# Patient Record
Sex: FEMALE | Race: BLACK OR AFRICAN AMERICAN | NOT HISPANIC OR LATINO | URBAN - METROPOLITAN AREA
[De-identification: names, ages, dates, MRNs, and addresses within clinical notes are randomized per-mention and may not be internally consistent; named-entity substitution may affect disease eponyms.]

---

## 2019-05-28 ENCOUNTER — EMERGENCY (EMERGENCY)
Facility: HOSPITAL | Age: 74
LOS: 1 days | Discharge: HOME | End: 2019-05-28
Admitting: EMERGENCY MEDICINE
Payer: MEDICARE

## 2019-05-28 VITALS
OXYGEN SATURATION: 99 % | DIASTOLIC BLOOD PRESSURE: 84 MMHG | SYSTOLIC BLOOD PRESSURE: 157 MMHG | TEMPERATURE: 98 F | RESPIRATION RATE: 18 BRPM | HEART RATE: 83 BPM

## 2019-05-28 DIAGNOSIS — Z98.89 OTHER SPECIFIED POSTPROCEDURAL STATES: Chronic | ICD-10-CM

## 2019-05-28 PROCEDURE — 99284 EMERGENCY DEPT VISIT MOD MDM: CPT | Mod: GC

## 2019-05-28 RX ORDER — AZITHROMYCIN 500 MG/1
1 TABLET, FILM COATED ORAL
Qty: 1 | Refills: 0
Start: 2019-05-28

## 2019-05-28 RX ORDER — IPRATROPIUM/ALBUTEROL SULFATE 18-103MCG
3 AEROSOL WITH ADAPTER (GRAM) INHALATION ONCE
Refills: 0 | Status: COMPLETED | OUTPATIENT
Start: 2019-05-28 | End: 2019-05-28

## 2019-05-28 RX ADMIN — Medication 100 MILLIGRAM(S): at 12:32

## 2019-05-28 RX ADMIN — Medication 3 MILLILITER(S): at 12:31

## 2019-05-28 NOTE — ED PROVIDER NOTE - OBJECTIVE STATEMENT
74 yo F with PMHX of HTN and HLD, and seasonal allergies to pollen presents to the ED c/o cough, intermittently productive with white mucus x 5 day. Pt reports she sometimes heres a wheezing when she cough. + runny nose. Takes Allegra for allergies, was taking coricidin for cought with no relief. Endorsing she sometimes felt her heart rate was fast. Denies f, c. Denies cp ,sob, leg swelling, nausea, vomiting, abdominal pain, dizziness, numbness tingling ,weakness. 72 yo F with PMHX of HTN and HLD, and seasonal allergies to pollen presents to the ED c/o cough, intermittently productive with white mucus x 5 day. Pt reports she sometimes heres a wheezing when she cough. + runny nose. Takes Allegra for allergies, was taking coricidin for cough with no relief. Endorsing she sometimes felt her heart rate was fast. Denies f, c. Denies cp ,sob, leg swelling, nausea, vomiting, abdominal pain, dizziness, numbness tingling ,weakness.

## 2019-05-28 NOTE — ED ADULT NURSE REASSESSMENT NOTE - ANCILLARY STATUS
awaiting radiology/pt no longer wants to wait for xray, states she has an appointment to follow up with her PMD Friday and doesn't understand why it is taking so long.

## 2019-05-28 NOTE — ED PROVIDER NOTE - CLINICAL SUMMARY MEDICAL DECISION MAKING FREE TEXT BOX
74 yo F c/o cough and intermittent fast HR.   no cp, no sob.   will r/o pna, likely bronchitis vs allergic.   Plan neb, tessalon perrles, cxr.   pt refusing EKG at this time.

## 2019-05-28 NOTE — ED ADULT TRIAGE NOTE - CHIEF COMPLAINT QUOTE
Pt c/o cough x 4 days with yellow and white mucus accompanied by intermittent palpitations. Denies palpitations presently, denies fever or chest pain. PMH HTN HLD

## 2019-05-28 NOTE — ED PROVIDER NOTE - NSFOLLOWUPINSTRUCTIONS_ED_ALL_ED_FT
Seen in ED for cough, likely viral upper respiratory tract infection.  Please drink plenty of fluids and follow the instructions below:   1)	If you have fever greater than 100F or generalized bodyaches then please take Tylenol 650 mg every 4 hours and Motrin 600 mg every 6 hours.   2)	If you have head congestion, sinus pressure, or nasal congestion then please take Allegra 120-180 mg every 24 hours or Zyrtec 10 mg every 24 hours.    3)	If you have throat discomfort please take Cepacol or Chloraseptic spray. Check bottle to make sure alcohol free.   4)	If you have persistent dry cough please take Delsym or Robitussin.   5)	If you have difficulty bringing up mucus please take Mucinex use as directed.   6)	Make a follow-up appointment with your primary doctor.   7)	Return to ED for any new or worsening symptoms.     Take Azithromycin as prescribed.

## 2019-05-28 NOTE — ED PROVIDER NOTE - PROGRESS NOTE DETAILS
pt refusing to wait for cxr, she reports that she had follow up with her doctor, thursday, Pt understands with cannot r/o pna or any other pulm process without cxr. Pt understands and would still like to leave.   Will send rx for azithro. Pt A&Ox 4, able to make medical decisions and is deciding to leave the hospital now without cxr. pt left without papers or cxr, called and had conversation with patient via phone, pt upset about wait and did not want to stay any longer, she reports that she had follow up with her doctor, thursday/ friday. Pt understands with cannot r/o pna or any other pulm process without cxr. Pt understands  Will send rx for azithro. Pt A&Ox 4 throughout ED stay, able to make medical decisions

## 2021-10-03 ENCOUNTER — EMERGENCY (EMERGENCY)
Facility: HOSPITAL | Age: 76
LOS: 1 days | Discharge: HOME | End: 2021-10-03
Admitting: EMERGENCY MEDICINE
Payer: MEDICARE

## 2021-10-03 VITALS
RESPIRATION RATE: 20 BRPM | HEIGHT: 65 IN | SYSTOLIC BLOOD PRESSURE: 146 MMHG | TEMPERATURE: 98 F | OXYGEN SATURATION: 100 % | DIASTOLIC BLOOD PRESSURE: 71 MMHG | HEART RATE: 78 BPM

## 2021-10-03 DIAGNOSIS — Z98.89 OTHER SPECIFIED POSTPROCEDURAL STATES: Chronic | ICD-10-CM

## 2021-10-03 PROCEDURE — 73130 X-RAY EXAM OF HAND: CPT | Mod: 26,RT

## 2021-10-03 PROCEDURE — 70450 CT HEAD/BRAIN W/O DYE: CPT | Mod: 26

## 2021-10-03 PROCEDURE — 99284 EMERGENCY DEPT VISIT MOD MDM: CPT

## 2021-10-03 RX ORDER — TETANUS TOXOID, REDUCED DIPHTHERIA TOXOID AND ACELLULAR PERTUSSIS VACCINE, ADSORBED 5; 2.5; 8; 8; 2.5 [IU]/.5ML; [IU]/.5ML; UG/.5ML; UG/.5ML; UG/.5ML
0.5 SUSPENSION INTRAMUSCULAR ONCE
Refills: 0 | Status: COMPLETED | OUTPATIENT
Start: 2021-10-03 | End: 2021-10-03

## 2021-10-03 RX ADMIN — TETANUS TOXOID, REDUCED DIPHTHERIA TOXOID AND ACELLULAR PERTUSSIS VACCINE, ADSORBED 0.5 MILLILITER(S): 5; 2.5; 8; 8; 2.5 SUSPENSION INTRAMUSCULAR at 16:27

## 2021-10-03 NOTE — ED PROVIDER NOTE - OBJECTIVE STATEMENT
Pt is a 77 y/o F PMHx HTN, HLD p/w fall today.  Pt reports 3.5 hours ago, pt tripped over a hose, causing pt to land onto bilateral outstretched hands and strike right frontal aspect of head w/o associated LOC.  Pt reports sustaining an abrasion at frontal aspect of head.  Pt reports mild pain at right palm.  Pt denies any fevers, chills, nausea, vomiting, neck pain/stiffness, chest pain, back pain, numbness, weakness, changes in vision/hearing, lightheadedness, dizziness, use of blood thinners, ETOH abuse or any other specific complaints.

## 2021-10-03 NOTE — ED PROVIDER NOTE - CARE PLAN
Principal Discharge DX:	Abrasion  Secondary Diagnosis:	Contusion  Secondary Diagnosis:	Head injury   1

## 2021-10-03 NOTE — ED PROVIDER NOTE - CLINICAL SUMMARY MEDICAL DECISION MAKING FREE TEXT BOX
Pt is a 77 y/o F PMHx HTN, HLD p/w fall today. -- abrasion; r/o ICH; r/o fracture of hand -- adacel, ct head, xray hand

## 2021-10-03 NOTE — ED PROVIDER NOTE - PROGRESS NOTE DETAILS
CINTIA WILLARD:  Xray of hand negative for fractures.  CT head with following impression: "No evidence for calvarial fracture or acute intracranial hemorrhage.    A subcentimeter area of increased density involves the left frontal parietal centrum semiovale region, unchanged compared to the 2013 head CT. This could reflect a developmental venous anomaly, cavernous malformation, or a calcification. Consider eventual follow-up brain MRI with and without contrast and MRA for clarification if clinically warranted, if there are no MRI or contrast contraindications."  Pt medically stable for discharge.  Strict return precautions given. Pt directed to have outpatient MRI with PMD.

## 2021-10-03 NOTE — ED PROVIDER NOTE - UPPER EXTREMITY EXAM, RIGHT
+tenderness and bruising at palmar aspect of right hand; 5/5 strength; sensation intact to light touch; < 2 sec capillary refill ;2+ pulses

## 2021-10-03 NOTE — ED ADULT TRIAGE NOTE - CHIEF COMPLAINT QUOTE
Patient tripped and fell over a lawn hose and hit her right forehead on the concrete. No LOC. Pt has swelling to the area as well as an open abrasion. Pt denies blood thinners.

## 2021-10-03 NOTE — ED PROVIDER NOTE - PATIENT PORTAL LINK FT
You can access the FollowMyHealth Patient Portal offered by [facility] by registering at the following website: [email or web address]. By joining [employer]’s FollowMyHealth portal, you will also be able to view your health information using other applications (apps) compatible with our system.

## 2021-10-03 NOTE — ED ADULT NURSE REASSESSMENT NOTE - NS ED NURSE REASSESS COMMENT FT1
Patient sitting up awake and alert appearing on no distress.  Tetanus given as ordered, awaiting head ct.

## 2021-10-03 NOTE — ED PROVIDER NOTE - NSFOLLOWUPINSTRUCTIONS_ED_ALL_ED_FT
Advance activity as tolerated.  Continue all previously prescribed medications as directed unless otherwise instructed.  Take Tylenol 650mg (Two 325 mg pills) every 4-6 hours as needed for pain or fevers. Keep extremity elevated and wrapped.  Apply cool compresses to affected area for 15 minutes, 3-4 times per day.  Apply bacitracin (available over the counter) twice daily to affected area until healed.  Follow up with your primary care physician in 48-72 hours- bring copies of your results.  Return to the ER for worsening or persistent symptoms, including but not limited to worsening/persistent pain, headaches, dizziness, numbness, weakness, changes in vision or hearing, difficulty walking, falls, and/or ANY NEW OR CONCERNING SYMPTOMS. If you have issues obtaining follow up, please call: [phone] to obtain a doctor or specialist who takes your insurance in your area.  You may call [phone] to make an appointment with the internal medicine clinic. Advance activity as tolerated.  Continue all previously prescribed medications as directed unless otherwise instructed.  Take Tylenol 650mg (Two 325 mg pills) every 4-6 hours as needed for pain or fevers. Keep extremity elevated and wrapped.  Apply cool compresses to affected area for 15 minutes, 3-4 times per day.  Apply bacitracin (available over the counter) twice daily to affected area until healed.  Follow up with your primary care physician (have him/her order you an MRI brain w/ and w/o iv contrast and MR Angio of the brain) in 48-72 hours- bring copies of your results.  Return to the ER for worsening or persistent symptoms, including but not limited to worsening/persistent pain, headaches, dizziness, numbness, weakness, changes in vision or hearing, difficulty walking, falls, and/or ANY NEW OR CONCERNING SYMPTOMS. If you have issues obtaining follow up, please call: [phone] to obtain a doctor or specialist who takes your insurance in your area.  You may call [phone] to make an appointment with the internal medicine clinic.

## 2024-11-05 ENCOUNTER — EMERGENCY (EMERGENCY)
Facility: HOSPITAL | Age: 79
LOS: 0 days | Discharge: HOME | End: 2024-11-05
Attending: STUDENT IN AN ORGANIZED HEALTH CARE EDUCATION/TRAINING PROGRAM
Payer: MEDICARE

## 2024-11-05 VITALS
HEART RATE: 90 BPM | RESPIRATION RATE: 17 BRPM | TEMPERATURE: 98 F | OXYGEN SATURATION: 99 % | DIASTOLIC BLOOD PRESSURE: 79 MMHG | HEIGHT: 64 IN | SYSTOLIC BLOOD PRESSURE: 190 MMHG | WEIGHT: 175.05 LBS

## 2024-11-05 VITALS
HEART RATE: 61 BPM | TEMPERATURE: 98 F | RESPIRATION RATE: 18 BRPM | OXYGEN SATURATION: 99 % | SYSTOLIC BLOOD PRESSURE: 134 MMHG | DIASTOLIC BLOOD PRESSURE: 72 MMHG

## 2024-11-05 DIAGNOSIS — K59.00 CONSTIPATION, UNSPECIFIED: ICD-10-CM

## 2024-11-05 DIAGNOSIS — Z98.89 OTHER SPECIFIED POSTPROCEDURAL STATES: Chronic | ICD-10-CM

## 2024-11-05 DIAGNOSIS — E78.5 HYPERLIPIDEMIA, UNSPECIFIED: ICD-10-CM

## 2024-11-05 DIAGNOSIS — I10 ESSENTIAL (PRIMARY) HYPERTENSION: ICD-10-CM

## 2024-11-05 PROCEDURE — 99283 EMERGENCY DEPT VISIT LOW MDM: CPT

## 2024-11-05 RX ORDER — POLYETHYLENE GLYCOL 3350 17 G/17G
17 POWDER, FOR SOLUTION ORAL
Qty: 1 | Refills: 0
Start: 2024-11-05 | End: 2024-11-11

## 2024-11-05 NOTE — ED PROVIDER NOTE - PHYSICAL EXAMINATION
General appearance: Nontoxic appearing, conversant, afebrile    Eyes: anicteric sclerae, KUSUM, EOMI   HENT: Atraumatic; oropharynx clear, MMM and no ulcerations, no pharyngeal erythema or exudate   Neck: Trachea midline; Full range of motion, supple   Pulm: normal respiratory effort and no intercostal retractions, normal work of breathing   Abdomen: Soft, non-tender, non-distended; no guarding or rebound   Extremities: No peripheral edema, no gross deformities, FROM x4   Rectal: No hemorrhoids or external lesions, + semi firm stool in vault   Skin: Dry, normal temperature, turgor and texture; no rash   Psych: Appropriate affect, cooperative

## 2024-11-05 NOTE — ED ADULT NURSE NOTE - CAS TRG GENERAL AIRWAY, MLM
Consent: The patient's consent was obtained including but not limited to risks of crusting, scabbing, blistering, scarring, darker or lighter pigmentary change, recurrence, incomplete removal and infection. Render Post-Care Instructions In Note?: no Duration Of Freeze Thaw-Cycle (Seconds): 0 Post-Care Instructions: I reviewed with the patient in detail post-care instructions. Patient is to wear sunprotection, and avoid picking at any of the treated lesions. Pt may apply Vaseline  or Aquaphor to crusted or scabbing areas. Detail Level: Detailed Patent

## 2024-11-05 NOTE — ED PROVIDER NOTE - PROGRESS NOTE DETAILS
Right after enema administration, patient had very large bowel movement.  Reassessed shortly after and patient with complete resolution of symptoms.  No longer has any discomfort, no abdominal tenderness and denies complaints.  Is tolerating po.  answered questions. Will dc

## 2024-11-05 NOTE — ED ADULT NURSE NOTE - OBJECTIVE STATEMENT
Patient is a 79 year old female complaining of rectal pain that started this morning. Patient arrived in the ED agitated and screaming for pain. Patient states that she is constipated. PMHX: HTN, HLD, Hemorroids.  Enema given as ordered. Large BM with formed stool x 2. No bleeding noted.

## 2024-11-05 NOTE — ED PROVIDER NOTE - NS_EDPROVIDERDISPOUSERTYPE_ED_A_ED
Reviewed anticoagulation note. I agree    Dr. Ramiro Villalobos   Attending Attestation (For Attendings USE Only)...

## 2024-11-05 NOTE — ED ADULT NURSE NOTE - NSFALLUNIVINTERV_ED_ALL_ED
Bed/Stretcher in lowest position, wheels locked, appropriate side rails in place/Call bell, personal items and telephone in reach/Instruct patient to call for assistance before getting out of bed/chair/stretcher/Non-slip footwear applied when patient is off stretcher/[place] to call system/Physically safe environment - no spills, clutter or unnecessary equipment/Purposeful proactive rounding/Room/bathroom lighting operational, light cord in reach

## 2024-11-05 NOTE — ED PROVIDER NOTE - NSFOLLOWUPINSTRUCTIONS_ED_ALL_ED_FT
Rest, drink plenty of fluids  Advance activity as tolerated  Continue all previously prescribed medications as directed  Follow up with your PMD - bring copies of your results  Return to the ER for severe pain, worsening symptoms, fevers, or other new or concerning symptoms   For constipation you may take miralax daily as needed

## 2024-11-05 NOTE — ED ADULT TRIAGE NOTE - CHIEF COMPLAINT QUOTE
Patient presents to ED c/o lower abdominal pain and constipation since this morning. Patient last bowel movement yesterday. Patient irritable in triage.   hx HTN

## 2024-11-05 NOTE — ED PROVIDER NOTE - CLINICAL SUMMARY MEDICAL DECISION MAKING FREE TEXT BOX
80yo female with pmh htn, hld, presenting with constipation.  Reports chronic constipation and yesterday felt need to go but only small firm stool was coming out.  Today feels like she needs to go and something is there but not coming out.  No fever, nausea, vomiting, abdominal pain.  Never had similar before.  No urinary symptoms or difficulty urinating.  Nontender abdomen.  On rectal exam no external lesions.  Firm stool in rectal vault on digital exam, likely impaction/ constipation.  Patient has no obstructive symptoms, is passing flatus and occasional stool with no n/v.  Low concern acute intraabdominal pathology.  Will give enema and reassess.

## 2024-11-05 NOTE — ED PROVIDER NOTE - PATIENT PORTAL LINK FT
You can access the FollowMyHealth Patient Portal offered by [facility] by registering at the following website: [email or web address]. By joining [email or web address]’s FollowMyHealth portal, you will also be able to view your health information using other applications (apps) compatible with our system.